# Patient Record
Sex: MALE | Race: WHITE | NOT HISPANIC OR LATINO | Employment: OTHER | URBAN - METROPOLITAN AREA
[De-identification: names, ages, dates, MRNs, and addresses within clinical notes are randomized per-mention and may not be internally consistent; named-entity substitution may affect disease eponyms.]

---

## 2021-03-05 ENCOUNTER — HOSPITAL ENCOUNTER (OUTPATIENT)
Dept: RADIOLOGY | Facility: MEDICAL CENTER | Age: 56
End: 2021-03-05
Payer: MEDICARE

## 2021-03-05 ENCOUNTER — HOSPITAL ENCOUNTER (INPATIENT)
Facility: MEDICAL CENTER | Age: 56
LOS: 3 days | DRG: 552 | End: 2021-03-08
Attending: EMERGENCY MEDICINE | Admitting: HOSPITALIST
Payer: MEDICARE

## 2021-03-05 DIAGNOSIS — S22.000A COMPRESSION FRACTURE OF BODY OF THORACIC VERTEBRA (HCC): ICD-10-CM

## 2021-03-05 DIAGNOSIS — S22.000A COMPRESSION FRACTURE OF THORACIC VERTEBRA, INITIAL ENCOUNTER, UNSPECIFIED THORACIC VERTEBRAL LEVEL: ICD-10-CM

## 2021-03-05 DIAGNOSIS — R56.9 SEIZURES (HCC): ICD-10-CM

## 2021-03-05 DIAGNOSIS — M54.50 ACUTE MIDLINE LOW BACK PAIN WITHOUT SCIATICA: ICD-10-CM

## 2021-03-05 DIAGNOSIS — S32.000A COMPRESSION FRACTURE OF LUMBAR VERTEBRA, INITIAL ENCOUNTER, UNSPECIFIED LUMBAR VERTEBRAL LEVEL: ICD-10-CM

## 2021-03-05 PROBLEM — H91.90 HEARING LOSS: Status: ACTIVE | Noted: 2021-03-05

## 2021-03-05 PROBLEM — R74.8 ELEVATED LIVER ENZYMES: Status: ACTIVE | Noted: 2021-03-05

## 2021-03-05 PROBLEM — D72.829 LEUKOCYTOSIS: Status: ACTIVE | Noted: 2021-03-05

## 2021-03-05 LAB
SARS-COV-2 RNA RESP QL NAA+PROBE: NOTDETECTED
SPECIMEN SOURCE: NORMAL

## 2021-03-05 PROCEDURE — 96372 THER/PROPH/DIAG INJ SC/IM: CPT

## 2021-03-05 PROCEDURE — 700102 HCHG RX REV CODE 250 W/ 637 OVERRIDE(OP): Performed by: HOSPITALIST

## 2021-03-05 PROCEDURE — 99285 EMERGENCY DEPT VISIT HI MDM: CPT

## 2021-03-05 PROCEDURE — U0003 INFECTIOUS AGENT DETECTION BY NUCLEIC ACID (DNA OR RNA); SEVERE ACUTE RESPIRATORY SYNDROME CORONAVIRUS 2 (SARS-COV-2) (CORONAVIRUS DISEASE [COVID-19]), AMPLIFIED PROBE TECHNIQUE, MAKING USE OF HIGH THROUGHPUT TECHNOLOGIES AS DESCRIBED BY CMS-2020-01-R: HCPCS

## 2021-03-05 PROCEDURE — U0005 INFEC AGEN DETEC AMPLI PROBE: HCPCS

## 2021-03-05 PROCEDURE — 99222 1ST HOSP IP/OBS MODERATE 55: CPT | Mod: AI | Performed by: HOSPITALIST

## 2021-03-05 PROCEDURE — 770006 HCHG ROOM/CARE - MED/SURG/GYN SEMI*

## 2021-03-05 PROCEDURE — 700111 HCHG RX REV CODE 636 W/ 250 OVERRIDE (IP): Performed by: HOSPITALIST

## 2021-03-05 PROCEDURE — 96374 THER/PROPH/DIAG INJ IV PUSH: CPT

## 2021-03-05 PROCEDURE — 700111 HCHG RX REV CODE 636 W/ 250 OVERRIDE (IP): Performed by: EMERGENCY MEDICINE

## 2021-03-05 PROCEDURE — 700105 HCHG RX REV CODE 258: Performed by: HOSPITALIST

## 2021-03-05 PROCEDURE — A9270 NON-COVERED ITEM OR SERVICE: HCPCS | Performed by: HOSPITALIST

## 2021-03-05 RX ORDER — DIVALPROEX SODIUM 500 MG/1
3000 TABLET, EXTENDED RELEASE ORAL 2 TIMES DAILY
COMMUNITY

## 2021-03-05 RX ORDER — FLUTICASONE PROPIONATE 50 MCG
2 SPRAY, SUSPENSION (ML) NASAL EVERY MORNING
COMMUNITY

## 2021-03-05 RX ORDER — LACOSAMIDE 100 MG/1
300 TABLET ORAL 2 TIMES DAILY
Status: DISCONTINUED | OUTPATIENT
Start: 2021-03-05 | End: 2021-03-08 | Stop reason: HOSPADM

## 2021-03-05 RX ORDER — LACOSAMIDE 200 MG/1
300 TABLET ORAL 2 TIMES DAILY
COMMUNITY

## 2021-03-05 RX ORDER — FLUTICASONE PROPIONATE 50 MCG
2 SPRAY, SUSPENSION (ML) NASAL EVERY MORNING
Status: DISCONTINUED | OUTPATIENT
Start: 2021-03-06 | End: 2021-03-08 | Stop reason: HOSPADM

## 2021-03-05 RX ORDER — ONDANSETRON 2 MG/ML
4 INJECTION INTRAMUSCULAR; INTRAVENOUS EVERY 4 HOURS PRN
Status: DISCONTINUED | OUTPATIENT
Start: 2021-03-05 | End: 2021-03-08 | Stop reason: HOSPADM

## 2021-03-05 RX ORDER — ESCITALOPRAM OXALATE 20 MG/1
20 TABLET ORAL
COMMUNITY

## 2021-03-05 RX ORDER — BUSPIRONE HYDROCHLORIDE 10 MG/1
10 TABLET ORAL 2 TIMES DAILY
Status: DISCONTINUED | OUTPATIENT
Start: 2021-03-05 | End: 2021-03-08 | Stop reason: HOSPADM

## 2021-03-05 RX ORDER — MORPHINE SULFATE 4 MG/ML
4 INJECTION, SOLUTION INTRAMUSCULAR; INTRAVENOUS ONCE
Status: COMPLETED | OUTPATIENT
Start: 2021-03-05 | End: 2021-03-05

## 2021-03-05 RX ORDER — PROMETHAZINE HYDROCHLORIDE 25 MG/1
12.5-25 TABLET ORAL EVERY 4 HOURS PRN
Status: DISCONTINUED | OUTPATIENT
Start: 2021-03-05 | End: 2021-03-08 | Stop reason: HOSPADM

## 2021-03-05 RX ORDER — PROMETHAZINE HYDROCHLORIDE 25 MG/1
12.5-25 SUPPOSITORY RECTAL EVERY 4 HOURS PRN
Status: DISCONTINUED | OUTPATIENT
Start: 2021-03-05 | End: 2021-03-08 | Stop reason: HOSPADM

## 2021-03-05 RX ORDER — ACETAMINOPHEN 325 MG/1
650 TABLET ORAL EVERY 6 HOURS PRN
Status: DISCONTINUED | OUTPATIENT
Start: 2021-03-05 | End: 2021-03-08 | Stop reason: HOSPADM

## 2021-03-05 RX ORDER — HYDROCODONE BITARTRATE AND ACETAMINOPHEN 5; 325 MG/1; MG/1
1-2 TABLET ORAL EVERY 6 HOURS PRN
Status: DISCONTINUED | OUTPATIENT
Start: 2021-03-05 | End: 2021-03-08 | Stop reason: HOSPADM

## 2021-03-05 RX ORDER — SODIUM CHLORIDE 9 MG/ML
INJECTION, SOLUTION INTRAVENOUS CONTINUOUS
Status: DISCONTINUED | OUTPATIENT
Start: 2021-03-05 | End: 2021-03-06

## 2021-03-05 RX ORDER — BISACODYL 10 MG
10 SUPPOSITORY, RECTAL RECTAL
Status: DISCONTINUED | OUTPATIENT
Start: 2021-03-05 | End: 2021-03-08 | Stop reason: HOSPADM

## 2021-03-05 RX ORDER — ESCITALOPRAM OXALATE 10 MG/1
20 TABLET ORAL
Status: DISCONTINUED | OUTPATIENT
Start: 2021-03-05 | End: 2021-03-08 | Stop reason: HOSPADM

## 2021-03-05 RX ORDER — DIVALPROEX SODIUM 500 MG/1
3000 TABLET, EXTENDED RELEASE ORAL 2 TIMES DAILY
Status: DISCONTINUED | OUTPATIENT
Start: 2021-03-05 | End: 2021-03-08 | Stop reason: HOSPADM

## 2021-03-05 RX ORDER — AMOXICILLIN 250 MG
2 CAPSULE ORAL 2 TIMES DAILY
Status: DISCONTINUED | OUTPATIENT
Start: 2021-03-05 | End: 2021-03-08 | Stop reason: HOSPADM

## 2021-03-05 RX ORDER — BUSPIRONE HYDROCHLORIDE 5 MG/1
10 TABLET ORAL 2 TIMES DAILY
COMMUNITY

## 2021-03-05 RX ORDER — LORAZEPAM 2 MG/ML
1 INJECTION INTRAMUSCULAR EVERY 4 HOURS PRN
Status: DISCONTINUED | OUTPATIENT
Start: 2021-03-05 | End: 2021-03-08 | Stop reason: HOSPADM

## 2021-03-05 RX ORDER — POLYETHYLENE GLYCOL 3350 17 G/17G
1 POWDER, FOR SOLUTION ORAL
Status: DISCONTINUED | OUTPATIENT
Start: 2021-03-05 | End: 2021-03-08 | Stop reason: HOSPADM

## 2021-03-05 RX ORDER — PROCHLORPERAZINE EDISYLATE 5 MG/ML
5-10 INJECTION INTRAMUSCULAR; INTRAVENOUS EVERY 4 HOURS PRN
Status: DISCONTINUED | OUTPATIENT
Start: 2021-03-05 | End: 2021-03-08 | Stop reason: HOSPADM

## 2021-03-05 RX ORDER — ONDANSETRON 4 MG/1
4 TABLET, ORALLY DISINTEGRATING ORAL EVERY 4 HOURS PRN
Status: DISCONTINUED | OUTPATIENT
Start: 2021-03-05 | End: 2021-03-08 | Stop reason: HOSPADM

## 2021-03-05 RX ADMIN — DIVALPROEX SODIUM 3000 MG: 500 TABLET, EXTENDED RELEASE ORAL at 17:25

## 2021-03-05 RX ADMIN — HYDROCODONE BITARTRATE AND ACETAMINOPHEN 2 TABLET: 5; 325 TABLET ORAL at 17:24

## 2021-03-05 RX ADMIN — BUSPIRONE HYDROCHLORIDE 10 MG: 10 TABLET ORAL at 17:25

## 2021-03-05 RX ADMIN — SODIUM CHLORIDE: 9 INJECTION, SOLUTION INTRAVENOUS at 22:28

## 2021-03-05 RX ADMIN — HYDROCODONE BITARTRATE AND ACETAMINOPHEN 1 TABLET: 5; 325 TABLET ORAL at 23:59

## 2021-03-05 RX ADMIN — ENOXAPARIN SODIUM 40 MG: 40 INJECTION SUBCUTANEOUS at 17:24

## 2021-03-05 RX ADMIN — LACOSAMIDE 300 MG: 100 TABLET, FILM COATED ORAL at 17:24

## 2021-03-05 RX ADMIN — ESLICARBAZEPINE ACETATE 1200 MG: 600 TABLET ORAL at 20:44

## 2021-03-05 RX ADMIN — ESCITALOPRAM OXALATE 20 MG: 10 TABLET ORAL at 20:43

## 2021-03-05 RX ADMIN — MORPHINE SULFATE 4 MG: 4 INJECTION INTRAVENOUS at 16:18

## 2021-03-05 ASSESSMENT — LIFESTYLE VARIABLES
HOW MANY TIMES IN THE PAST YEAR HAVE YOU HAD 5 OR MORE DRINKS IN A DAY: 0
HAVE YOU EVER FELT YOU SHOULD CUT DOWN ON YOUR DRINKING: NO
ON A TYPICAL DAY WHEN YOU DRINK ALCOHOL HOW MANY DRINKS DO YOU HAVE: 2
TOTAL SCORE: 0
CONSUMPTION TOTAL: NEGATIVE
EVER HAD A DRINK FIRST THING IN THE MORNING TO STEADY YOUR NERVES TO GET RID OF A HANGOVER: NO
ALCOHOL_USE: YES
SUBSTANCE_ABUSE: 0
DOES PATIENT WANT TO STOP DRINKING: NO
HAVE PEOPLE ANNOYED YOU BY CRITICIZING YOUR DRINKING: NO
AVERAGE NUMBER OF DAYS PER WEEK YOU HAVE A DRINK CONTAINING ALCOHOL: 1
TOTAL SCORE: 0
TOTAL SCORE: 0
EVER FELT BAD OR GUILTY ABOUT YOUR DRINKING: NO

## 2021-03-05 ASSESSMENT — ENCOUNTER SYMPTOMS
SORE THROAT: 0
DIAPHORESIS: 0
BLURRED VISION: 0
FEVER: 0
FOCAL WEAKNESS: 0
GASTROINTESTINAL NEGATIVE: 1
BRUISES/BLEEDS EASILY: 0
VOMITING: 0
EYES NEGATIVE: 1
SHORTNESS OF BREATH: 0
HEADACHES: 1
CARDIOVASCULAR NEGATIVE: 1
CONSTITUTIONAL NEGATIVE: 1
WEIGHT LOSS: 0
NAUSEA: 0
WEAKNESS: 0
CHILLS: 0
MYALGIAS: 0
RESPIRATORY NEGATIVE: 1
COUGH: 0
BACK PAIN: 1
PSYCHIATRIC NEGATIVE: 1
DEPRESSION: 0
DIZZINESS: 0
ABDOMINAL PAIN: 0
PALPITATIONS: 0

## 2021-03-05 ASSESSMENT — PAIN DESCRIPTION - PAIN TYPE
TYPE: ACUTE PAIN

## 2021-03-05 NOTE — ED PROVIDER NOTES
ED Provider Note    CHIEF COMPLAINT  Seizures    HPI  Mac Styles is a 55 y.o. male who presents on transfer from [facility] for higher level of care.  Patient had a recent multiple seizures.  Patient takes Vimpat, Apitiom.  Patient was evaluated for low back pain and flank pain after a seizure.  No acute numbness or weakness.  Work-up at the [facility] with CT scan that demonstrated acute compression deformities T11-L2 described with mild height loss.  CT scan of his abdomen and pelvis was negative for acute pathology.  Patient stated his seizures started 11 years ago.  In December, he stopped taking Depakote in addition to his other 2 seizure medicines.  Patient had 2 seizures yesterday while riding in a car which is very abnormal for him.  He is having a headache right now which she states is usual for having a seizure.  No seizure activity today.  He states low back pain was moderate yesterday, severe this morning.  He denies acute numbness or weakness.  No acute vision change.  Patient states when his seizure control is good he has a seizure proximally once every 3 months, when it is not doing well, he states he will have 1 seizure a week.  Patient was returning from evaluation at [facility] in the car when his seizures happened yesterday.  He states he had an MRI of his brain 2 days ago.      REVIEW OF SYSTEMS    Constitutional: No fever  Respiratory: No shortness of breath  Cardiac: No chest pain or syncope  Gastrointestinal: No abdominal pain  Musculoskeletal: Flank pain, low back pain  Neurologic: Seizures       All other systems are negative.       PAST MEDICAL HISTORY  No past medical history on file.    FAMILY HISTORY  No family history on file.    SOCIAL HISTORY  Social History     Socioeconomic History   • Marital status: Not on file     Spouse name: Not on file   • Number of children: Not on file   • Years of education: Not on file   • Highest education level: Not on file   Occupational  History   • Not on file   Tobacco Use   • Smoking status: Not on file   Substance and Sexual Activity   • Alcohol use: Not on file   • Drug use: Not on file   • Sexual activity: Not on file   Other Topics Concern   • Not on file   Social History Narrative   • Not on file     Social Determinants of Health     Financial Resource Strain:    • Difficulty of Paying Living Expenses:    Food Insecurity:    • Worried About Running Out of Food in the Last Year:    • Ran Out of Food in the Last Year:    Transportation Needs:    • Lack of Transportation (Medical):    • Lack of Transportation (Non-Medical):    Physical Activity:    • Days of Exercise per Week:    • Minutes of Exercise per Session:    Stress:    • Feeling of Stress :    Social Connections:    • Frequency of Communication with Friends and Family:    • Frequency of Social Gatherings with Friends and Family:    • Attends Rastafari Services:    • Active Member of Clubs or Organizations:    • Attends Club or Organization Meetings:    • Marital Status:    Intimate Partner Violence:    • Fear of Current or Ex-Partner:    • Emotionally Abused:    • Physically Abused:    • Sexually Abused:        SURGICAL HISTORY  No past surgical history on file.    CURRENT MEDICATIONS  Home Medications    **Home medications have not yet been reviewed for this encounter**         ALLERGIES  Not on File    PHYSICAL EXAM  VITAL SIGNS: Ht 1.829 m (6')   Wt 61.2 kg (135 lb)   BMI 18.31 kg/m²   Constitutional:  Non-toxic appearance.   HENT: No facial swelling, tongue bite  Eyes: Anicteric, no conjunctivitis.  No fields intact.  Extraocular motions intact.  No nystagmus.  Pupils are 3 mm bilateral, equally reactive to light  Cardiovascular: Normal heart rate, Normal rhythm  Pulmonary:  No wheezing, No rales.   Gastrointestinal: Soft, mild periumbilical tenderness, No distention.  Skin: Warm, Dry, No cyanosis.  No asymmetric edema  Neurologic: Speech is clear, follows commands, facial  expression is symmetrical.  Sensation and strength intact.  Psychiatric: Affect normal,  Mood normal.  Patient is calm and cooperative  Musculoskeletal: Lumbar spine tender midline and left paraspinal.  Extremities nontender.  Chest wall nontender.    EKG/Labs  Results for orders placed or performed during the hospital encounter of 03/05/21   SARS-CoV-2 PCR (24 hour In-House): Collect NP swab in VTM    Specimen: Respirate   Result Value Ref Range    SARS-CoV-2 Source NP Swab     SARS-CoV-2 by PCR NotDetected          RADIOLOGY/PROCEDURES  CT lumbar spine showed compression fractures.  CT scan abdomen pelvis with no acute findings.  Patient states he had MRI of his brain 2 days ago at [facility].    COURSE & MEDICAL DECISION MAKING  Pertinent Labs & Imaging studies reviewed. (See chart for details)  Dr. Mcfarlane consulted from neurosurgery regarding possible need for TLSO brace, other intervention.  Hospitalist service has agreed to admit patient to hospital for ongoing evaluation of his seizures, possible medication adjustment, neurology consultation.  Pain treated with morphine in the emergency department.  He is currently neurologically intact, resting.    FINAL IMPRESSION     1. Seizures (HCC)     2. Acute midline low back pain without sciatica     3. Compression fracture of thoracic vertebra, initial encounter, unspecified thoracic vertebral level (HCC)     4. Compression fracture of lumbar vertebra, initial encounter, unspecified lumbar vertebral level (HCC)                     Electronically signed by: Derrick Miner M.D., 3/5/2021 2:26 PM

## 2021-03-05 NOTE — ED NOTES
Pharmacy Medication Reconciliation      Medication reconciliation updated and complete per pt at bedside & pt home pharmacy  Allergies have been verified and updated   No oral ABX within the last 14 days  Patient home pharmacy:[facility]
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
## 2021-03-05 NOTE — ASSESSMENT & PLAN NOTE
Patient presents with low back pain , ridging shows T12 compression fracture can Bishnu to trauma from seizure activity  Patient is neurovascularly intact  Neurosurgery recommending TLSO brace and outpatient follow-up  Supportive care with pain control  PT OT

## 2021-03-05 NOTE — PROGRESS NOTES
Post-seizure contiguous compression fractures. Custom TLSO ordered, wear when out of bed, not in shower not sleeping, Q4 hr neuro checks

## 2021-03-05 NOTE — ASSESSMENT & PLAN NOTE
Patient has a long history of epilepsy after a traumatic brain injury approximately 10 years ago, he follows with neurology at [facility] .  He recently had medication changes .  Patient is a poor historian and is unable to explicitly tell me the doses of his current medication regiment.  He states that he has never had good control in the long as he is ever been without a seizure is 2 months.  States that he usually has seizures approximately once a week.  He does not drive  Mri at [facility] yesterday, did attempt to reach his neurologist at [facility] however this was unsuccessful and the office is not open on the weekends  Do not feel at this time that further imaging is necessary as he just had an MRI the day of admission  Depakote level is supratherapeutic at 134, Segundo p.m. dose with repeat level in the morning  May need to consult inpatient neurology for recommendations on antiepileptic regiment and patient's report of refractory seizure  Continue his home antiepileptic regiment  As needed IV Ativan for seizure activity  Seizure and fall precautions

## 2021-03-05 NOTE — ED TRIAGE NOTES
Chief Complaint   Patient presents with   • Seizure     Pt BIB EMS, transfer from [facility] for seizures (tonic clonic). pt with hx of SZ/epilepsy. pt found to have retropulsion of T-12/L-1, pt A&O3-4        Pt/staff masked and in appropriate PPE during encounter.

## 2021-03-06 LAB
ALBUMIN SERPL BCP-MCNC: 3.8 G/DL (ref 3.2–4.9)
ALBUMIN/GLOB SERPL: 1.4 G/DL
ALP SERPL-CCNC: 61 U/L (ref 30–99)
ALT SERPL-CCNC: 32 U/L (ref 2–50)
ANION GAP SERPL CALC-SCNC: 11 MMOL/L (ref 7–16)
AST SERPL-CCNC: 28 U/L (ref 12–45)
BASOPHILS # BLD AUTO: 0.4 % (ref 0–1.8)
BASOPHILS # BLD: 0.04 K/UL (ref 0–0.12)
BILIRUB SERPL-MCNC: 1.4 MG/DL (ref 0.1–1.5)
BUN SERPL-MCNC: 12 MG/DL (ref 8–22)
CALCIUM SERPL-MCNC: 8.9 MG/DL (ref 8.5–10.5)
CHLORIDE SERPL-SCNC: 104 MMOL/L (ref 96–112)
CO2 SERPL-SCNC: 22 MMOL/L (ref 20–33)
CREAT SERPL-MCNC: 0.74 MG/DL (ref 0.5–1.4)
EOSINOPHIL # BLD AUTO: 0.03 K/UL (ref 0–0.51)
EOSINOPHIL NFR BLD: 0.3 % (ref 0–6.9)
ERYTHROCYTE [DISTWIDTH] IN BLOOD BY AUTOMATED COUNT: 46.5 FL (ref 35.9–50)
GLOBULIN SER CALC-MCNC: 2.7 G/DL (ref 1.9–3.5)
GLUCOSE SERPL-MCNC: 104 MG/DL (ref 65–99)
HCT VFR BLD AUTO: 42.9 % (ref 42–52)
HGB BLD-MCNC: 15 G/DL (ref 14–18)
IMM GRANULOCYTES # BLD AUTO: 0.06 K/UL (ref 0–0.11)
IMM GRANULOCYTES NFR BLD AUTO: 0.6 % (ref 0–0.9)
LYMPHOCYTES # BLD AUTO: 1.92 K/UL (ref 1–4.8)
LYMPHOCYTES NFR BLD: 18 % (ref 22–41)
MCH RBC QN AUTO: 35 PG (ref 27–33)
MCHC RBC AUTO-ENTMCNC: 35 G/DL (ref 33.7–35.3)
MCV RBC AUTO: 100.2 FL (ref 81.4–97.8)
MONOCYTES # BLD AUTO: 0.71 K/UL (ref 0–0.85)
MONOCYTES NFR BLD AUTO: 6.7 % (ref 0–13.4)
NEUTROPHILS # BLD AUTO: 7.89 K/UL (ref 1.82–7.42)
NEUTROPHILS NFR BLD: 74 % (ref 44–72)
NRBC # BLD AUTO: 0 K/UL
NRBC BLD-RTO: 0 /100 WBC
PLATELET # BLD AUTO: 158 K/UL (ref 164–446)
PMV BLD AUTO: 9.8 FL (ref 9–12.9)
POTASSIUM SERPL-SCNC: 3.7 MMOL/L (ref 3.6–5.5)
PROT SERPL-MCNC: 6.5 G/DL (ref 6–8.2)
RBC # BLD AUTO: 4.28 M/UL (ref 4.7–6.1)
SODIUM SERPL-SCNC: 137 MMOL/L (ref 135–145)
WBC # BLD AUTO: 10.7 K/UL (ref 4.8–10.8)

## 2021-03-06 PROCEDURE — 306637 HCHG MISC ORTHO ITEM RC 0274

## 2021-03-06 PROCEDURE — 99233 SBSQ HOSP IP/OBS HIGH 50: CPT | Performed by: STUDENT IN AN ORGANIZED HEALTH CARE EDUCATION/TRAINING PROGRAM

## 2021-03-06 PROCEDURE — L0486 TLSO RIGIDLINED CUST FAB TWO: HCPCS

## 2021-03-06 PROCEDURE — A9270 NON-COVERED ITEM OR SERVICE: HCPCS | Performed by: HOSPITALIST

## 2021-03-06 PROCEDURE — 96372 THER/PROPH/DIAG INJ SC/IM: CPT

## 2021-03-06 PROCEDURE — 97162 PT EVAL MOD COMPLEX 30 MIN: CPT

## 2021-03-06 PROCEDURE — 700111 HCHG RX REV CODE 636 W/ 250 OVERRIDE (IP): Performed by: HOSPITALIST

## 2021-03-06 PROCEDURE — 700102 HCHG RX REV CODE 250 W/ 637 OVERRIDE(OP): Performed by: HOSPITALIST

## 2021-03-06 PROCEDURE — 770006 HCHG ROOM/CARE - MED/SURG/GYN SEMI*

## 2021-03-06 PROCEDURE — 80053 COMPREHEN METABOLIC PANEL: CPT

## 2021-03-06 PROCEDURE — 36415 COLL VENOUS BLD VENIPUNCTURE: CPT

## 2021-03-06 PROCEDURE — 85025 COMPLETE CBC W/AUTO DIFF WBC: CPT

## 2021-03-06 RX ORDER — LIDOCAINE HYDROCHLORIDE 20 MG/ML
15 SOLUTION OROPHARYNGEAL
Status: DISCONTINUED | OUTPATIENT
Start: 2021-03-06 | End: 2021-03-08 | Stop reason: HOSPADM

## 2021-03-06 RX ADMIN — BUSPIRONE HYDROCHLORIDE 10 MG: 10 TABLET ORAL at 17:19

## 2021-03-06 RX ADMIN — HYDROCODONE BITARTRATE AND ACETAMINOPHEN 1 TABLET: 5; 325 TABLET ORAL at 21:16

## 2021-03-06 RX ADMIN — DIVALPROEX SODIUM 3000 MG: 500 TABLET, EXTENDED RELEASE ORAL at 04:34

## 2021-03-06 RX ADMIN — ESCITALOPRAM OXALATE 20 MG: 10 TABLET ORAL at 21:16

## 2021-03-06 RX ADMIN — HYDROCODONE BITARTRATE AND ACETAMINOPHEN 2 TABLET: 5; 325 TABLET ORAL at 11:24

## 2021-03-06 RX ADMIN — DIVALPROEX SODIUM 3000 MG: 500 TABLET, EXTENDED RELEASE ORAL at 17:19

## 2021-03-06 RX ADMIN — ESLICARBAZEPINE ACETATE 1200 MG: 600 TABLET ORAL at 21:16

## 2021-03-06 RX ADMIN — LACOSAMIDE 300 MG: 100 TABLET, FILM COATED ORAL at 17:19

## 2021-03-06 RX ADMIN — ENOXAPARIN SODIUM 40 MG: 40 INJECTION SUBCUTANEOUS at 04:40

## 2021-03-06 RX ADMIN — DOCUSATE SODIUM 50 MG AND SENNOSIDES 8.6 MG 2 TABLET: 8.6; 5 TABLET, FILM COATED ORAL at 17:20

## 2021-03-06 RX ADMIN — LACOSAMIDE 300 MG: 100 TABLET, FILM COATED ORAL at 04:33

## 2021-03-06 RX ADMIN — BUSPIRONE HYDROCHLORIDE 10 MG: 10 TABLET ORAL at 04:34

## 2021-03-06 RX ADMIN — FLUTICASONE PROPIONATE 100 MCG: 50 SPRAY, METERED NASAL at 04:33

## 2021-03-06 ASSESSMENT — COGNITIVE AND FUNCTIONAL STATUS - GENERAL
MOBILITY SCORE: 15
STANDING UP FROM CHAIR USING ARMS: A LITTLE
MOVING TO AND FROM BED TO CHAIR: A LOT
DRESSING REGULAR LOWER BODY CLOTHING: A LITTLE
CLIMB 3 TO 5 STEPS WITH RAILING: A LOT
DAILY ACTIVITIY SCORE: 22
MOVING FROM LYING ON BACK TO SITTING ON SIDE OF FLAT BED: A LITTLE
SUGGESTED CMS G CODE MODIFIER MOBILITY: CK
TURNING FROM BACK TO SIDE WHILE IN FLAT BAD: A LITTLE
TOILETING: A LITTLE
MOBILITY SCORE: 18
SUGGESTED CMS G CODE MODIFIER DAILY ACTIVITY: CJ
SUGGESTED CMS G CODE MODIFIER MOBILITY: CK
CLIMB 3 TO 5 STEPS WITH RAILING: A LOT
MOVING FROM LYING ON BACK TO SITTING ON SIDE OF FLAT BED: A LOT
STANDING UP FROM CHAIR USING ARMS: A LITTLE
MOVING TO AND FROM BED TO CHAIR: A LITTLE
WALKING IN HOSPITAL ROOM: A LITTLE
WALKING IN HOSPITAL ROOM: A LITTLE

## 2021-03-06 ASSESSMENT — PAIN DESCRIPTION - PAIN TYPE
TYPE: ACUTE PAIN

## 2021-03-06 ASSESSMENT — ENCOUNTER SYMPTOMS
NERVOUS/ANXIOUS: 0
ABDOMINAL PAIN: 0
BLURRED VISION: 0
VOMITING: 0
SEIZURES: 1
DIZZINESS: 0
CHILLS: 0
NAUSEA: 0
SHORTNESS OF BREATH: 0
FOCAL WEAKNESS: 0
COUGH: 0
DEPRESSION: 0
BACK PAIN: 1
SPEECH CHANGE: 0
FEVER: 0
SORE THROAT: 0

## 2021-03-06 ASSESSMENT — PATIENT HEALTH QUESTIONNAIRE - PHQ9
SUM OF ALL RESPONSES TO PHQ9 QUESTIONS 1 AND 2: 0
2. FEELING DOWN, DEPRESSED, IRRITABLE, OR HOPELESS: NOT AT ALL
1. LITTLE INTEREST OR PLEASURE IN DOING THINGS: NOT AT ALL

## 2021-03-06 ASSESSMENT — GAIT ASSESSMENTS
DISTANCE (FEET): 100
DEVIATION: BRADYKINETIC
ASSISTIVE DEVICE: FRONT WHEEL WALKER

## 2021-03-06 ASSESSMENT — FIBROSIS 4 INDEX: FIB4 SCORE: 1.66

## 2021-03-06 NOTE — THERAPY
Physical Therapy Contact Note    PT consult received, pt awaiting custom TLSO for OOB mobility; will follow up after available when able for full PT evaluation;    Jessica Rogel, PT,  [phone]
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
## 2021-03-06 NOTE — PROGRESS NOTES
2 RN skin check complete with CASEY Guerrero.  Devices in place: nasal cannula.  Skin assessed under devices.  Skin clean, dry, and intact.

## 2021-03-06 NOTE — PROGRESS NOTES
Submitted custom order for TLSO brace.  To check the status of the order please call [phone].

## 2021-03-06 NOTE — CARE PLAN
Problem: Communication  Goal: The ability to communicate needs accurately and effectively will improve  Outcome: PROGRESSING AS EXPECTED    Problem: Safety  Goal: Will remain free from injury  Outcome: PROGRESSING AS EXPECTED     Problem: Infection  Goal: Will remain free from infection  Outcome: PROGRESSING AS EXPECTED     Problem: Knowledge Deficit  Goal: Knowledge of disease process/condition, treatment plan, diagnostic tests, and medications will improve  Outcome: PROGRESSING AS EXPECTED     Problem: Pain Management  Goal: Pain level will decrease to patient's comfort goal  Outcome: PROGRESSING AS EXPECTED     Problem: Bowel/Gastric:  Goal: Normal bowel function is maintained or improved  Outcome: PROGRESSING SLOWER THAN EXPECTED

## 2021-03-06 NOTE — PROGRESS NOTES
Received report from Iza PEÑA. Pt is alert and oriented x4. Pt is not complaining of any pain and does not appear to be in distress. Respirations are normal. Pt is resting comfortably in bed at this time. Bed alarm is on and call light is within reach. Will continue to monitor patient.

## 2021-03-07 LAB — VALPROATE SERPL-MCNC: 134.2 UG/ML (ref 50–100)

## 2021-03-07 PROCEDURE — 99233 SBSQ HOSP IP/OBS HIGH 50: CPT | Performed by: STUDENT IN AN ORGANIZED HEALTH CARE EDUCATION/TRAINING PROGRAM

## 2021-03-07 PROCEDURE — 97165 OT EVAL LOW COMPLEX 30 MIN: CPT

## 2021-03-07 PROCEDURE — 700111 HCHG RX REV CODE 636 W/ 250 OVERRIDE (IP): Performed by: HOSPITALIST

## 2021-03-07 PROCEDURE — 770006 HCHG ROOM/CARE - MED/SURG/GYN SEMI*

## 2021-03-07 PROCEDURE — A9270 NON-COVERED ITEM OR SERVICE: HCPCS | Performed by: HOSPITALIST

## 2021-03-07 PROCEDURE — 96372 THER/PROPH/DIAG INJ SC/IM: CPT

## 2021-03-07 PROCEDURE — 80164 ASSAY DIPROPYLACETIC ACD TOT: CPT

## 2021-03-07 PROCEDURE — 700102 HCHG RX REV CODE 250 W/ 637 OVERRIDE(OP): Performed by: HOSPITALIST

## 2021-03-07 RX ADMIN — BUSPIRONE HYDROCHLORIDE 10 MG: 10 TABLET ORAL at 17:50

## 2021-03-07 RX ADMIN — HYDROCODONE BITARTRATE AND ACETAMINOPHEN 1 TABLET: 5; 325 TABLET ORAL at 17:51

## 2021-03-07 RX ADMIN — DOCUSATE SODIUM 50 MG AND SENNOSIDES 8.6 MG 2 TABLET: 8.6; 5 TABLET, FILM COATED ORAL at 17:50

## 2021-03-07 RX ADMIN — DOCUSATE SODIUM 50 MG AND SENNOSIDES 8.6 MG 2 TABLET: 8.6; 5 TABLET, FILM COATED ORAL at 06:04

## 2021-03-07 RX ADMIN — ENOXAPARIN SODIUM 40 MG: 40 INJECTION SUBCUTANEOUS at 06:03

## 2021-03-07 RX ADMIN — BUSPIRONE HYDROCHLORIDE 10 MG: 10 TABLET ORAL at 06:03

## 2021-03-07 RX ADMIN — HYDROCODONE BITARTRATE AND ACETAMINOPHEN 1 TABLET: 5; 325 TABLET ORAL at 08:01

## 2021-03-07 RX ADMIN — ESLICARBAZEPINE ACETATE 1200 MG: 600 TABLET ORAL at 20:13

## 2021-03-07 RX ADMIN — ESCITALOPRAM OXALATE 20 MG: 10 TABLET ORAL at 20:13

## 2021-03-07 RX ADMIN — LACOSAMIDE 300 MG: 100 TABLET, FILM COATED ORAL at 06:03

## 2021-03-07 RX ADMIN — FLUTICASONE PROPIONATE 100 MCG: 50 SPRAY, METERED NASAL at 06:03

## 2021-03-07 RX ADMIN — DIVALPROEX SODIUM 3000 MG: 500 TABLET, EXTENDED RELEASE ORAL at 06:03

## 2021-03-07 RX ADMIN — LACOSAMIDE 300 MG: 100 TABLET, FILM COATED ORAL at 17:50

## 2021-03-07 ASSESSMENT — ENCOUNTER SYMPTOMS
SHORTNESS OF BREATH: 0
BACK PAIN: 1
FOCAL WEAKNESS: 0
SEIZURES: 0
NAUSEA: 0
DIZZINESS: 0
ABDOMINAL PAIN: 0
VOMITING: 0
FEVER: 0
CHILLS: 0
COUGH: 0
SPEECH CHANGE: 0
NERVOUS/ANXIOUS: 0
DEPRESSION: 0
BLURRED VISION: 0
SORE THROAT: 0

## 2021-03-07 ASSESSMENT — COGNITIVE AND FUNCTIONAL STATUS - GENERAL
DAILY ACTIVITIY SCORE: 20
DRESSING REGULAR UPPER BODY CLOTHING: A LOT
SUGGESTED CMS G CODE MODIFIER DAILY ACTIVITY: CJ
TOILETING: A LITTLE
HELP NEEDED FOR BATHING: A LITTLE

## 2021-03-07 ASSESSMENT — PAIN DESCRIPTION - PAIN TYPE: TYPE: ACUTE PAIN

## 2021-03-07 NOTE — PROGRESS NOTES
Hospital Medicine Daily Progress Note    Date of Service  3/6/2021    Chief Complaint  55 y.o. male admitted 3/5/2021 with seizure and T12 compression fracture    Hospital Course  Mr. Styles is a pleasant 55-year-old gentleman with a past medical history of epilepsy that started 10 years ago after traumatic brain injury.  Reports that his seizures have never been well controlled despite many medication changes he is followed by Dr. Alex Christian of neurology at [facility].  He was traveling back from [facility] on 3/5/2021 when he had to back-to-back tonic-clonic seizures in the vehicle.  Following this he developed severe low back pain and was unable to ambulate so he came to the ER in [place] where he was found to have compression fracture of T12.  He was transferred here for further evaluation.  Here he was evaluated by neurosurgery Dr. Ki Mcfarlane who recommended a TLSO brace and every 4 hours neurochecks.  No surgical intervention is planned at this time.       Interval Problem Update  Patient seen and examined at bedside, complains of low back pain.  Patient has been unable to work with PT as he is still awaiting delivery of his TLSO brace.   -His vitals are stable and he is afebrile  -He has not had a seizure since admission.  He states he has never had good control of his seizures  -Dispo pending PT/OT  -Continue his current antiepileptic regiment    Consultants/Specialty  Neurosurgery    Code Status  Full Code    Disposition  Anticipate home in the next 24 to 48 hours ending PT OT evaluation    Review of Systems  Review of Systems   Constitutional: Negative for chills, fever and malaise/fatigue.   HENT: Negative for congestion and sore throat.    Eyes: Negative for blurred vision.   Respiratory: Negative for cough and shortness of breath.    Cardiovascular: Negative for chest pain and leg swelling.   Gastrointestinal: Negative for abdominal pain, nausea and vomiting.   Genitourinary: Negative for dysuria.    Musculoskeletal: Positive for back pain.   Neurological: Positive for seizures. Negative for dizziness, speech change and focal weakness.   Psychiatric/Behavioral: Negative for depression. The patient is not nervous/anxious.         Physical Exam  Temp:  [36.3 °C (97.3 °F)-37.2 °C (98.9 °F)] 36.3 °C (97.3 °F)  Pulse:  [82-95] 89  Resp:  [15-17] 15  BP: (115-137)/(76-88) 122/81  SpO2:  [92 %-97 %] 96 %    Physical Exam  Vitals and nursing note reviewed.   Constitutional:       General: He is not in acute distress.     Appearance: Normal appearance. He is obese. He is not ill-appearing.   HENT:      Head: Normocephalic and atraumatic.      Mouth/Throat:      Mouth: Mucous membranes are dry.      Pharynx: Oropharynx is clear.      Comments: Poor oral dentition  Eyes:      Extraocular Movements: Extraocular movements intact.      Conjunctiva/sclera: Conjunctivae normal.   Cardiovascular:      Rate and Rhythm: Normal rate and regular rhythm.      Pulses: Normal pulses.   Pulmonary:      Effort: Pulmonary effort is normal.      Breath sounds: Normal breath sounds.   Abdominal:      General: Bowel sounds are normal.      Palpations: Abdomen is soft.   Musculoskeletal:         General: Tenderness present.      Cervical back: Normal range of motion.      Right lower leg: No edema.      Left lower leg: No edema.      Comments: Limited ability to sit up due to back pain   Skin:     Capillary Refill: Capillary refill takes less than 2 seconds.   Neurological:      General: No focal deficit present.      Mental Status: He is alert and oriented to person, place, and time. Mental status is at baseline.      Cranial Nerves: No cranial nerve deficit.      Deep Tendon Reflexes: Reflexes normal.   Psychiatric:         Mood and Affect: Mood normal.         Behavior: Behavior normal.         Fluids    Intake/Output Summary (Last 24 hours) at 3/6/2021 1610  Last data filed at 3/5/2021 2349  Gross per 24 hour   Intake --   Output 750 ml    Net -750 ml       Laboratory  Recent Labs     03/06/21  0331   WBC 10.7   RBC 4.28*   HEMOGLOBIN 15.0   HEMATOCRIT 42.9   .2*   MCH 35.0*   MCHC 35.0   RDW 46.5   PLATELETCT 158*   MPV 9.8     Recent Labs     03/06/21  0331   SODIUM 137   POTASSIUM 3.7   CHLORIDE 104   CO2 22   GLUCOSE 104*   BUN 12   CREATININE 0.74   CALCIUM 8.9                   Imaging  OUTSIDE IMAGES-CT ABDOMEN /PELVIS   Final Result      OUTSIDE IMAGES-CT LUMBAR SPINE   Final Result           Assessment/Plan  * Compression fracture of body of thoracic vertebra (HCC)  Assessment & Plan  Patient presents with low back pain , ridging shows T12 compression fracture can Bishnu to trauma from seizure activity  Patient is neurovascularly intact  Neurosurgery recommending TLSO brace and outpatient follow-up  Supportive care with pain control  PT OT    Seizure (HCC)  Assessment & Plan  Patient has a long history of epilepsy after a traumatic brain injury approximately 10 years ago, he follows with neurology at [facility] .  He recently had medication changes .  Patient is a poor historian and is unable to explicitly tell me the doses of his current medication regiment.  He states that he has never had good control in the long as he is ever been without a seizure is 2 months.  States that he usually has seizures approximately once a week.  He does not drive  Mri at [facility] yesterday, did attempt to reach his neurologist at [facility] however this was unsuccessful and the office is not open on the weekends  Do not feel at this time that further imaging is necessary as he just had an MRI the day of admission  Depakote level pending  Continue his home antiepileptic regiment  As needed IV Ativan for seizure activity  Seizure and fall precautions    Elevated liver enzymes  Assessment & Plan  [place] labs, mild elevation of AST at 42 and t bili at 1.6  Resolved    Leukocytosis  Assessment & Plan  Mild, 12.9 at [place]  Likely reactive  Resolved    Hearing  loss  Assessment & Plan  chronic       VTE prophylaxis: Lovenox

## 2021-03-07 NOTE — CARE PLAN
Problem: Safety  Goal: Will remain free from falls  Outcome: PROGRESSING AS EXPECTED  Note: Bed low and locked. Bed alarm on. Non slip socks in use. Pt verbalizes understanding of fall risk.     Problem: Safety:  Goal: Encourage identification and reduction of causative stimuli  Outcome: PROGRESSING AS EXPECTED  Note: Lights dimmed, low stimuli provided.

## 2021-03-07 NOTE — PROGRESS NOTES
Called from lab about patients critical lab results. Valproic acid 134.2. Notified day shift nurse and visualized notification of MD.

## 2021-03-07 NOTE — THERAPY
Physical Therapy   Initial Evaluation     Patient Name: Mac Styles  Age:  55 y.o., Sex:  male  Medical Record #: [id number]  Today's Date: 3/6/2021     Precautions: Fall Risk, Spinal / Back Precautions , TLSO (Thoracolumbosacral orthosis)    Assessment  Patient is 55 y.o. male with a diagnosis of L1 and L2 compression fx d/t a seizure w/ convulsions and LOC. He lives alone in a motor home w/ no functional mobility deficits and ambulating community distances w/ no AD.  He has a 10yr hx of seizures, and back pain that he has been seeking OP PT treatment for about a month prior to admission.  The pt is able to perform bed mobility w/ Bob for trunk support and VC to maintain log roll.  His TLSO was donned EOB, and he was able to STS spv and ambulate about 100' CGA using FWW.  He was provided edu on spinal precautions and exercise recommendations w/ adequate carryover.  Recommend OP PT services to address higher level limitations based on pt's current presentation and good motivation.  Pt should receive at least 1 more session w/ PT for bed mobility and stair training prior to d/c.  Will follow.      Plan    Recommend Physical Therapy 4 times per week until therapy goals are met for the following treatments:  Bed Mobility, Community Re-integration, Equipment, Gait Training, Manual Therapy, Neuro Re-Education / Balance, Orthotics Training, Self Care/Home Evaluation, Stair Training, Therapeutic Activities and Therapeutic Exercises    DC Equipment Recommendations: Unable to determine at this time  Discharge Recommendations: Recommend outpatient physical therapy services to address higher level deficits       Subjective/Objective       03/06/21 1625   Prior Living Situation   Prior Services Home-Independent   Housing / Facility Motor Home   Steps Into Home 5   Steps In Home 5  (up to bed)   Equipment Owned None   Lives with - Patient's Self Care Capacity Alone and Able to Care For Self   Comments Pt reports he can stay w/ a  friend in SS home w/ no steps at d/c   Prior Level of Functional Mobility   Bed Mobility Independent   Transfer Status Independent   Ambulation Independent   Distance Ambulation (Feet)   (community distances)   Assistive Devices Used None   Stairs Independent   History of Falls   History of Falls No   Cognition    Cognition / Consciousness X   Speech/ Communication Hard of Hearing   Level of Consciousness Alert   Comments pt pleasant and cooperative   Strength Lower Body   Lower Body Strength  WDL   Comments Observed during functional mobility   Sensation Lower Body   Lower Extremity Sensation   WDL   Comments Reports no numbness/tingling in LE's   Neurological Concerns   Neurological Concerns Yes   Comments Hx of seizures   Coordination Lower Body    Coordination Lower Body  WDL   Vision   Vision Comments Pt reports poor vision at baseline, was going to see optometrist before admitted   Balance Assessment   Sitting Balance (Static) Fair +   Sitting Balance (Dynamic) Fair +   Standing Balance (Static) Fair -   Standing Balance (Dynamic) Fair -   Weight Shift Sitting Fair   Weight Shift Standing Fair   Comments stand using FWW   Gait Analysis   Gait Level Of Assist   (CGA)   Assistive Device Front Wheel Walker   Distance (Feet) 100   # of Times Distance was Traveled 1   Deviation Bradykinetic   Weight Bearing Status FWB B LE   Comments TLSO donned EOB prior   Bed Mobility    Supine to Sit Minimal Assist   Sit to Supine Minimal Assist   Scooting Supervised   Rolling Supervised   Comments Log roll, HOB flat   Functional Mobility   Sit to Stand Supervised   Mobility FWW in front   Comments TLSO donned EOB   How much difficulty does the patient currently have...   Turning over in bed (including adjusting bedclothes, sheets and blankets)? 3   Sitting down on and standing up from a chair with arms (e.g., wheelchair, bedside commode, etc.) 2   Moving from lying on back to sitting on the side of the bed? 2   How much help  from another person does the patient currently need...   Moving to and from a bed to a chair (including a wheelchair)? 3   Need to walk in a hospital room? 3   Climbing 3-5 steps with a railing? 2   6 clicks Mobility Score 15   Activity Tolerance   Sitting Edge of Bed 10min   Standing 10min   Short Term Goals    Short Term Goal # 1 Pt will demonstrate supine<>sit EOB w/ log roll and HOB flat spv in 6 visits for functional mobility.   Short Term Goal # 2 Pt will demonstrate understanding of spinal precautions in 3 visits for proper healing of fx.   Short Term Goal # 3 Pt will demonstrate ability to ambulate 200' spv using FWW in 6 visits for community ambulation.   Short Term Goal # 4 Pt will demonstrate ability to ascend/descend 5 stairs spv w/ B UE support on railing in 6 visits for mobility into/out of his home.   Education Group   Education Provided Spine Precautions;Role of Physical Therapist;Brace Wear and Care   Spine Precautions Patient Response Patient;Acceptance;Explanation;Demonstration;Handout;Verbal Demonstration;Action Demonstration   Role of Physical Therapist Patient Response Patient;Acceptance;Explanation;Demonstration;Verbal Demonstration;Action Demonstration   Brace Wear & Care Patient Response Patient;Acceptance;Explanation;Demonstration;Verbal Demonstration;Action Demonstration   Problem List    Problems Pain;Impaired Bed Mobility;Impaired Transfers;Impaired Ambulation;Impaired Balance;Decreased Activity Tolerance   Anticipated Discharge Equipment and Recommendations   DC Equipment Recommendations Unable to determine at this time   Discharge Recommendations Recommend outpatient physical therapy services to address higher level deficits

## 2021-03-07 NOTE — HOSPITAL COURSE
Mr. Styles is a pleasant 55-year-old gentleman with a past medical history of epilepsy that started 10 years ago after traumatic brain injury.  Reports that his seizures have never been well controlled despite many medication changes he is followed by Dr. Alex Christian of neurology at [facility].  He was traveling back from [facility] on 3/5/2021 when he had to back-to-back tonic-clonic seizures in the vehicle.  Following this he developed severe low back pain and was unable to ambulate so he came to the ER in [place] where he was found to have compression fracture of T12.  He was transferred here for further evaluation.  Here he was evaluated by neurosurgery Dr. Ki Mcfarlane who recommended a TLSO brace and every 4 hours neurochecks.  No surgical intervention is planned at this time.

## 2021-03-07 NOTE — THERAPY
"Occupational Therapy   Initial Evaluation     Patient Name: Mac Styles  Age:  55 y.o., Sex:  male  Medical Record #: [id number]  Today's Date: 3/7/2021     Precautions  Precautions: Fall Risk, Spinal / Back Precautions , TLSO (Thoracolumbosacral orthosis)  Comments: custom TLSO donned at EOB, difficult fit.     Assessment  Patient is 55 y.o. male with a diagnosis of T12, L1-L2 compression fractures following a seizure.  Additional factors influencing patient status / progress: Hx of seizures. Custom TLSO ordered for him, dons at EOB. Patient is doing quite well overall, he was able to log roll independently to come to sitting without help, did report increased pain once seated EOB. The TLSO fits him just right, so there isn't a lot of wiggle room and donning it while patient is sitting is challenging. He was able to stand without assist, walked half the unit with FWW without any compromised balance. He reports that he will be staying with a friend following discharge. At this time, he really only needs help with the TLSO, would be beneficial for his friend to be educated on how to help him don it.       Plan    Recommend Occupational Therapy 3 times per week until therapy goals are met for the following treatments:  Adaptive Equipment, Cognitive Skill Development, Manual Therapy Techniques, Neuro Re-Education / Balance, Self Care/Activities of Daily Living, Therapeutic Activities and Therapeutic Exercises.        Subjective    \"How am I supposed to get this thing on by myself?\"     Objective       03/07/21 0934   Prior Living Situation   Prior Services None   Housing / Facility Motor Home   Steps Into Home 5   Steps In Home 5   Equipment Owned None   Lives with - Patient's Self Care Capacity Alone and Able to Care For Self   Comments Patient reports that he will be staying with a friend at [place] - the friend works full time, but their home has fewer steps to enter and has a flat walk in shower.    Prior Level of ADL " Function   Comments Ind all   Prior Level of IADL Function   Comments Ind all, though does not drive   Precautions   Precautions Fall Risk;Spinal / Back Precautions ;TLSO (Thoracolumbosacral orthosis)   Comments custom TLSO donned at EOB, difficult fit.    Cognition    Cognition / Consciousness X   Speech/ Communication Hard of Hearing   Level of Consciousness Alert   Comments Pleasant and cooperative   Balance Assessment   Sitting Balance (Static) Fair +   Sitting Balance (Dynamic) Fair +   Standing Balance (Static) Fair +   Standing Balance (Dynamic) Fair   Weight Shift Sitting Good   Weight Shift Standing Fair   Bed Mobility    Supine to Sit Supervised   ADL Assessment   Eating Supervision   Upper Body Dressing Supervision   Lower Body Dressing Maximal Assist  (for TLSO)   Functional Mobility   Sit to Stand Supervised   Bed, Chair, Wheelchair Transfer Minimal Assist   Transfer Method Stand Pivot   Mobility sup>sit, STS   Activity Tolerance   Sitting in Chair 10+ mins   Sitting Edge of Bed 10 mins   Standing 5 mins

## 2021-03-07 NOTE — PROGRESS NOTES
Hospital Medicine Daily Progress Note    Date of Service  3/7/2021    Chief Complaint  55 y.o. male admitted 3/5/2021 with seizure and T12 compression fracture    Hospital Course  Mr. Styles is a pleasant 55-year-old gentleman with a past medical history of epilepsy that started 10 years ago after traumatic brain injury.  Reports that his seizures have never been well controlled despite many medication changes he is followed by Dr. Alxe Christian of neurology at [facility].  He was traveling back from [facility] on 3/5/2021 when he had to back-to-back tonic-clonic seizures in the vehicle.  Following this he developed severe low back pain and was unable to ambulate so he came to the ER in [place] where he was found to have compression fracture of T12.  He was transferred here for further evaluation.  Here he was evaluated by neurosurgery Dr. Ki Mcfarlane who recommended a TLSO brace and every 4 hours neurochecks.  No surgical intervention is planned at this time.       Interval Problem Update  Patient seen and examined at bedside, still with low back pain, as well as some inner left thigh pain described as cramping.  No tenderness on palpation or overlying erythema, suspect muscle strain.  -TLSO brace is at bedside, patient physical therapy referral recommended by PT, OT is pending  -Supratherapeutic valproic acid level this morning of 134.2, will hold his p.m. dose, and recheck level tomorrow morning  -May need neurology consult tomorrow for recommendations for antiepileptics, and refractory seizure  -He has not had a seizure since admission.  He states he has never had good control of his seizures  -Anticipate possible discharge home tomorrow    Consultants/Specialty  Neurosurgery    Code Status  Full Code    Disposition  Anticipate discharge home tomorrow    Review of Systems  Review of Systems   Constitutional: Negative for chills, fever and malaise/fatigue.   HENT: Negative for congestion and sore throat.    Eyes: Negative for  blurred vision.   Respiratory: Negative for cough and shortness of breath.    Cardiovascular: Negative for chest pain and leg swelling.   Gastrointestinal: Negative for abdominal pain, nausea and vomiting.   Genitourinary: Negative for dysuria.   Musculoskeletal: Positive for back pain.   Neurological: Negative for dizziness, speech change, focal weakness and seizures.   Psychiatric/Behavioral: Negative for depression. The patient is not nervous/anxious.         Physical Exam  Temp:  [35.7 °C (96.2 °F)-36.4 °C (97.6 °F)] 36.1 °C (97 °F)  Pulse:  [73-90] 85  Resp:  [15-16] 16  BP: (117-129)/(76-89) 129/80  SpO2:  [93 %-96 %] 96 %    Physical Exam  Vitals and nursing note reviewed.   Constitutional:       General: He is not in acute distress.     Appearance: Normal appearance. He is obese. He is not ill-appearing.   HENT:      Head: Normocephalic and atraumatic.      Mouth/Throat:      Mouth: Mucous membranes are dry.      Pharynx: Oropharynx is clear.      Comments: Poor oral dentition  Eyes:      Extraocular Movements: Extraocular movements intact.      Conjunctiva/sclera: Conjunctivae normal.   Cardiovascular:      Rate and Rhythm: Normal rate and regular rhythm.      Pulses: Normal pulses.   Pulmonary:      Effort: Pulmonary effort is normal.      Breath sounds: Normal breath sounds.   Abdominal:      General: Bowel sounds are normal.      Palpations: Abdomen is soft.   Musculoskeletal:         General: Tenderness present.      Cervical back: Normal range of motion.      Right lower leg: No edema.      Left lower leg: No edema.      Comments: Mobility improved   Skin:     Capillary Refill: Capillary refill takes less than 2 seconds.   Neurological:      General: No focal deficit present.      Mental Status: He is alert and oriented to person, place, and time. Mental status is at baseline.      Cranial Nerves: No cranial nerve deficit.      Deep Tendon Reflexes: Reflexes normal.   Psychiatric:         Mood and  Affect: Mood normal.         Behavior: Behavior normal.         Fluids    Intake/Output Summary (Last 24 hours) at 3/7/2021 1200  Last data filed at 3/7/2021 0200  Gross per 24 hour   Intake 550 ml   Output 650 ml   Net -100 ml       Laboratory  Recent Labs     03/06/21  0331   WBC 10.7   RBC 4.28*   HEMOGLOBIN 15.0   HEMATOCRIT 42.9   .2*   MCH 35.0*   MCHC 35.0   RDW 46.5   PLATELETCT 158*   MPV 9.8     Recent Labs     03/06/21  0331   SODIUM 137   POTASSIUM 3.7   CHLORIDE 104   CO2 22   GLUCOSE 104*   BUN 12   CREATININE 0.74   CALCIUM 8.9                   Imaging  OUTSIDE IMAGES-CT ABDOMEN /PELVIS   Final Result      OUTSIDE IMAGES-CT LUMBAR SPINE   Final Result           Assessment/Plan  * Compression fracture of body of thoracic vertebra (HCC)  Assessment & Plan  Patient presents with low back pain , ridging shows T12 compression fracture can Bishnu to trauma from seizure activity  Patient is neurovascularly intact  Neurosurgery recommending TLSO brace and outpatient follow-up  Supportive care with pain control  PT OT    Seizure (HCC)  Assessment & Plan  Patient has a long history of epilepsy after a traumatic brain injury approximately 10 years ago, he follows with neurology at [facility] .  He recently had medication changes .  Patient is a poor historian and is unable to explicitly tell me the doses of his current medication regiment.  He states that he has never had good control in the long as he is ever been without a seizure is 2 months.  States that he usually has seizures approximately once a week.  He does not drive  Mri at [facility] yesterday, did attempt to reach his neurologist at [facility] however this was unsuccessful and the office is not open on the weekends  Do not feel at this time that further imaging is necessary as he just had an MRI the day of admission  Depakote level is supratherapeutic at 134, Seg[place] p.m. dose with repeat level in the morning  May need to consult inpatient neurology for  recommendations on antiepileptic regiment and patient's report of refractory seizure  Continue his home antiepileptic regiment  As needed IV Ativan for seizure activity  Seizure and fall precautions    Elevated liver enzymes  Assessment & Plan  [place] labs, mild elevation of AST at 42 and t bili at 1.6  Resolved    Leukocytosis  Assessment & Plan  Mild, 12.9 at [place]  Likely reactive  Resolved    Hearing loss  Assessment & Plan  chronic       VTE prophylaxis: Lovenox

## 2021-03-08 VITALS
HEIGHT: 72 IN | TEMPERATURE: 97.5 F | BODY MASS INDEX: 40.31 KG/M2 | WEIGHT: 297.62 LBS | DIASTOLIC BLOOD PRESSURE: 99 MMHG | SYSTOLIC BLOOD PRESSURE: 146 MMHG | OXYGEN SATURATION: 96 % | HEART RATE: 84 BPM | RESPIRATION RATE: 18 BRPM

## 2021-03-08 LAB — VALPROATE SERPL-MCNC: 113.9 UG/ML (ref 50–100)

## 2021-03-08 PROCEDURE — 97535 SELF CARE MNGMENT TRAINING: CPT | Mod: CO

## 2021-03-08 PROCEDURE — 99239 HOSP IP/OBS DSCHRG MGMT >30: CPT | Performed by: STUDENT IN AN ORGANIZED HEALTH CARE EDUCATION/TRAINING PROGRAM

## 2021-03-08 PROCEDURE — 80164 ASSAY DIPROPYLACETIC ACD TOT: CPT

## 2021-03-08 PROCEDURE — A9270 NON-COVERED ITEM OR SERVICE: HCPCS | Performed by: HOSPITALIST

## 2021-03-08 PROCEDURE — 96372 THER/PROPH/DIAG INJ SC/IM: CPT

## 2021-03-08 PROCEDURE — 97116 GAIT TRAINING THERAPY: CPT

## 2021-03-08 PROCEDURE — 700111 HCHG RX REV CODE 636 W/ 250 OVERRIDE (IP): Performed by: HOSPITALIST

## 2021-03-08 PROCEDURE — 700102 HCHG RX REV CODE 250 W/ 637 OVERRIDE(OP): Performed by: HOSPITALIST

## 2021-03-08 RX ORDER — ACETAMINOPHEN AND CODEINE PHOSPHATE 300; 30 MG/1; MG/1
1 TABLET ORAL EVERY 6 HOURS PRN
Qty: 20 TABLET | Refills: 0 | Status: SHIPPED | OUTPATIENT
Start: 2021-03-08 | End: 2021-03-13

## 2021-03-08 RX ORDER — ACETAMINOPHEN 500 MG
500 TABLET ORAL EVERY 6 HOURS PRN
COMMUNITY
Start: 2021-03-08

## 2021-03-08 RX ORDER — HYDROCODONE BITARTRATE AND ACETAMINOPHEN 5; 325 MG/1; MG/1
1 TABLET ORAL EVERY 8 HOURS PRN
Qty: 9 TABLET | Refills: 0 | Status: SHIPPED | OUTPATIENT
Start: 2021-03-08 | End: 2021-03-08

## 2021-03-08 RX ADMIN — FLUTICASONE PROPIONATE 100 MCG: 50 SPRAY, METERED NASAL at 04:59

## 2021-03-08 RX ADMIN — ACETAMINOPHEN 650 MG: 325 TABLET, FILM COATED ORAL at 09:04

## 2021-03-08 RX ADMIN — BUSPIRONE HYDROCHLORIDE 10 MG: 10 TABLET ORAL at 04:59

## 2021-03-08 RX ADMIN — ENOXAPARIN SODIUM 40 MG: 40 INJECTION SUBCUTANEOUS at 04:59

## 2021-03-08 RX ADMIN — LACOSAMIDE 300 MG: 100 TABLET, FILM COATED ORAL at 04:59

## 2021-03-08 RX ADMIN — DOCUSATE SODIUM 50 MG AND SENNOSIDES 8.6 MG 2 TABLET: 8.6; 5 TABLET, FILM COATED ORAL at 04:59

## 2021-03-08 ASSESSMENT — COGNITIVE AND FUNCTIONAL STATUS - GENERAL
STANDING UP FROM CHAIR USING ARMS: A LITTLE
DAILY ACTIVITIY SCORE: 21
MOVING TO AND FROM BED TO CHAIR: A LITTLE
CLIMB 3 TO 5 STEPS WITH RAILING: A LITTLE
MOVING FROM LYING ON BACK TO SITTING ON SIDE OF FLAT BED: A LITTLE
SUGGESTED CMS G CODE MODIFIER MOBILITY: CK
WALKING IN HOSPITAL ROOM: A LITTLE
DRESSING REGULAR UPPER BODY CLOTHING: A LITTLE
HELP NEEDED FOR BATHING: A LITTLE
SUGGESTED CMS G CODE MODIFIER DAILY ACTIVITY: CJ
TOILETING: A LITTLE
MOBILITY SCORE: 19

## 2021-03-08 ASSESSMENT — GAIT ASSESSMENTS
DISTANCE (FEET): 100
GAIT LEVEL OF ASSIST: MODIFIED INDEPENDENT
ASSISTIVE DEVICE: FRONT WHEEL WALKER

## 2021-03-08 ASSESSMENT — PAIN DESCRIPTION - PAIN TYPE
TYPE: ACUTE PAIN
TYPE: ACUTE PAIN

## 2021-03-08 NOTE — DISCHARGE INSTRUCTIONS
Discharge Instructions    Discharged to home by taxi with self. Discharged via wheelchair, hospital escort: Yes.  Special equipment needed: TLSO    Be sure to schedule a follow-up appointment with your primary care doctor or any specialists as instructed.     Discharge Plan:   Diet Plan: Discussed  Activity Level: Discussed  Confirmed Follow up Appointment: Patient to Call and Schedule Appointment  Confirmed Symptoms Management: Discussed  Medication Reconciliation Updated: Yes  Influenza Vaccine Indication: Not indicated: Previously immunized this influenza season and > 8 years of age    I understand that a diet low in cholesterol, fat, and sodium is recommended for good health. Unless I have been given specific instructions below for another diet, I accept this instruction as my diet prescription.   Other diet: regular    Special Instructions: None    · Is patient discharged on Warfarin / Coumadin?   No     Depression / Suicide Risk    As you are discharged from this [facility] facility, it is important to learn how to keep safe from harming yourself.    Recognize the warning signs:  · Abrupt changes in personality, positive or negative- including increase in energy   · Giving away possessions  · Change in eating patterns- significant weight changes-  positive or negative  · Change in sleeping patterns- unable to sleep or sleeping all the time   · Unwillingness or inability to communicate  · Depression  · Unusual sadness, discouragement and loneliness  · Talk of wanting to die  · Neglect of personal appearance   · Rebelliousness- reckless behavior  · Withdrawal from people/activities they love  · Confusion- inability to concentrate     If you or a loved one observes any of these behaviors or has concerns about self-harm, here's what you can do:  · Talk about it- your feelings and reasons for harming yourself  · Remove any means that you might use to hurt yourself (examples: pills, rope, extension cords,  firearm)  · Get professional help from the community (Mental Health, Substance Abuse, psychological counseling)  · Do not be alone:Call your Safe Contact- someone whom you trust who will be there for you.  · Call your local CRISIS HOTLINE 541-7412 or 984-754-6358  · Call your local Children's Mobile Crisis Response Team [place] (939) 615-5108 or www.[employer].com  · Call the toll free National Suicide Prevention Hotlines   · National Suicide Prevention Lifeline 509-779-DHOA (8123)  · [employer] Line Network 800-SUICIDE (223-8832)        Keep back brace on, okay to take off while sleeping and bathing, information for follow up appointment with neurosurgery was provided  Take medication as provided   Recommend that you follow up with your neurologist and let him know of your hospitalization so that medication changes can be made if needed   Spinal Compression Fracture       A spinal compression fracture is a collapse of the bones that form the spine (vertebrae). With this type of fracture, the vertebrae become pushed (compressed) into a wedge shape. Most compression fractures happen in the middle or lower part of the spine.  What are the causes?  This condition may be caused by:  · Thinning and loss of density in the bones (osteoporosis). This is the most common cause.  · A fall.  · A car or motorcycle accident.  · Cancer.  · Trauma, such as a heavy, direct hit to the head or back.  What increases the risk?  You are more likely to develop this condition if:  · You are 60 years or older.  · You have osteoporosis.  · You have certain types of cancer, including:  ? Multiple myeloma.  ? Lymphoma.  ? Prostate cancer.  ? Lung cancer.  ? Breast cancer.  What are the signs or symptoms?  Symptoms of this condition include:  · Severe pain.  · Pain that gets worse over time.  · Pain that is worse when you stand, walk, sit, or bend.  · Sudden pain that is so bad that it is hard for you to move.  · Bending or humping  of the spine.  · Gradual loss of height.  · Numbness, tingling, or weakness in the back and legs.  · Trouble walking.  Your symptoms will depend on the cause of the fracture and how quickly it develops.  How is this diagnosed?  This condition may be diagnosed based on symptoms, medical history, and a physical exam. During the physical exam, your health care provider may tap along the length of your spine to check for tenderness. Tests may be done to confirm the diagnosis. They may include:  · A bone mineral density test to check for osteoporosis.  · Imaging tests, such as a spine X-ray, CT scan, or MRI.  How is this treated?  Treatment for this condition depends on the cause and severity of the condition. Some fractures may heal on their own with supportive care. Treatment may include:  · Pain medicine.  · Rest.  · A back brace.  · Physical therapy exercises.  · Medicine to strengthen bone.  · Calcium and vitamin D supplements.  Fractures that cause the back to become misshapen, cause nerve pain or weakness, or do not respond to other treatment may be treated with surgery. This may include:  · Vertebroplasty. Bone cement is injected into the collapsed vertebrae to stabilize them.  · Balloon kyphoplasty. The collapsed vertebrae are expanded with a balloon and then bone cement is injected into them.  · Spinal fusion. The collapsed vertebrae are connected (fused) to normal vertebrae.  Follow these instructions at home:  Medicines  · Take over-the-counter and prescription medicines only as told by your health care provider.  · Do not drive or operate heavy machinery while taking prescription pain medicine.  · If you are taking prescription pain medicine, take actions to prevent or treat constipation. Your health care provider may recommend that you:  ? Drink enough fluid to keep your urine pale yellow.  ? Eat foods that are high in fiber, such as fresh fruits and vegetables, whole grains, and beans.  ? Limit foods that  are high in fat and processed sugars, such as fried or sweet foods.  ? Take an over-the-counter or prescription medicine for constipation.  If you have a brace:  · Wear the brace as told by your health care provider. Remove it only as told by your health care provider.  · Loosen the brace if your fingers or toes tingle, become numb, or turn cold and blue.  · Keep the brace clean.  · If the brace is not waterproof:  ? Do not let it get wet.  ? Cover it with a watertight covering when you take a bath or a shower.  Managing pain, stiffness, and swelling       · If directed, apply ice to the injured area:  ? If you have a removable brace, remove it as told by your health care provider.  ? Put ice in a plastic bag.  ? Place a towel between your skin and the bag.  ? Leave the ice on for 30 minutes every two hours at first. Then apply the ice as needed.  Activity  · Rest as told by your health care provider.  ? Avoid sitting for a long time without moving. Get up to take short walks every 1-2 hours. This is important to improve blood flow and breathing. Ask for help if you feel weak or unsteady.  · Return to your normal activities as directed by your health care provider. Ask what activities are safe for you.  · Do exercises to improve motion and strength in your back (physical therapy), as recommended by your health care provider.  · Exercise regularly as directed by your health care provider.  General instructions       · Do not drink alcohol. Alcohol can interfere with your treatment.  · Do not use any products that contain nicotine or tobacco, such as cigarettes and e-cigarettes. These can delay bone healing. If you need help quitting, ask your health care provider.  · Keep all follow-up visits as told by your health care provider. This is important. It can help to prevent permanent injury, disability, and long-lasting (chronic) pain.  Contact a health care provider if:  · You have a fever.  · You develop a cough that  makes your pain worse.  · Your pain medicine is not helping.  · Your pain does not get better over time.  · You cannot return to your normal activities as planned or expected.  Get help right away if:  · Your pain is very bad and it suddenly gets worse.  · You are unable to move any body part (paralysis) that is below the level of your injury.  · You have numbness, tingling, or weakness in any body part that is below the level of your injury.  · You cannot control your bladder or bowels.  Summary  · A spinal compression fracture is a collapse of the bones that form the spine (vertebrae).  · With this type of fracture, the vertebrae become pushed (compressed) into a wedge shape.  · Your symptoms and treatment will depend on the cause and severity of the fracture and how quickly it develops.  · Some fractures may heal on their own with supportive care. Fractures that cause the back to become misshapen, cause nerve pain or weakness, or do not respond to other treatment may be treated with surgery.  This information is not intended to replace advice given to you by your health care provider. Make sure you discuss any questions you have with your health care provider.  Document Released: 12/18/2006 Document Revised: 02/13/2020 Document Reviewed: 01/29/2019  Elsevier Patient Education © 2020 [employer]

## 2021-03-08 NOTE — PROGRESS NOTES
Patient's IV removed and discharge instructions given. Patient discharged to home via [employer] with TLSO and FWW.

## 2021-03-08 NOTE — CARE PLAN
Problem: Communication  Goal: The ability to communicate needs accurately and effectively will improve  Outcome: PROGRESSING AS EXPECTED     Problem: Safety  Goal: Will remain free from falls  Outcome: PROGRESSING AS EXPECTED     Problem: Venous Thromboembolism (VTW)/Deep Vein Thrombosis (DVT) Prevention:  Goal: Patient will participate in Venous Thrombosis (VTE)/Deep Vein Thrombosis (DVT)Prevention Measures  Outcome: PROGRESSING AS EXPECTED     Problem: Pain Management  Goal: Pain level will decrease to patient's comfort goal  Outcome: PROGRESSING AS EXPECTED  Note: Use pharm and non pharm interventions for pain management. Educate patient on scales used and interventions to help.

## 2021-03-08 NOTE — CARE PLAN
Problem: Safety  Goal: Will remain free from falls  Outcome: PROGRESSING AS EXPECTED  Note: Pt. SBA with FWW to ambulate. Pt. Calls appropriately. Bed locked and in lowest position with bed and frame alarm on. Room clean and free of clutter, well lit, call light within reach.      Problem: Discharge Barriers/Planning  Goal: Patient's continuum of care needs will be met  Outcome: PROGRESSING AS EXPECTED  Note: PT/OT have seen Pt.and made outpatient recommendations. Pt. Still has high levels of valproic acid, but levels are trending down. MD aware, will continue to monitor.

## 2021-03-08 NOTE — DISCHARGE PLANNING
Received Transport Form @ 1121  Spoke to Tisha @ [facility]    Transport is scheduled for 3/8 @5305-5074 going friend's home: [address] in [place], [place].  Trip #[id number]  Quote $977.21    Received Choice form at 1136  Agency/Facility Name: [facility]  Referral sent per Choice form @ 1211     1212  Per RN NADIYA Barriga to cancel [facility] transport. [employer] will be arranged.  NADIYA spoke to Tank at [facility] to cancel transport.

## 2021-03-08 NOTE — DISCHARGE PLANNING
Anticipated Discharge Disposition:   Friend Yaneli's house in [facility]  Transportation--[employer]---approved services  Outpatient PT/OT    Action:    Request to Hospital Care Management for [employer].  Approved.      Voalte msalmas to bedside RN Nusrat in [facility] to please call [phone] to initiate [employer].    Barriers to Discharge:    None    Plan:    Wait for Uber.

## 2021-03-08 NOTE — THERAPY
Physical Therapy   Daily Treatment     Patient Name: Mac Styles  Age:  55 y.o., Sex:  male  Medical Record #: [id number]  Today's Date: 3/8/2021     Precautions: Fall Risk, Spinal / Back Precautions     Assessment    Pt performed well with PT today. Good attention to spinal precautions with bed mobility, transfers, and gait. Pt able to yasmine TLSO in sitting EOB with supervision. Tolerated amb 2x100' with FWW mod I, and performed 2x2 steps with supv and railing. Pt demonstrating adequate safety and mobility to discharge home. Recommend further PT in the outpatient setting pending physician approval.    Plan    Continue current treatment plan.    DC Equipment Recommendations: (P) Front-Wheel Walker  Discharge Recommendations: (P) Recommend outpatient physical therapy services to address higher level deficits      Subjective    Agreeable to work with PT. States no concerns about D/C. States he plans to stay at friend's home for a while.     Objective       03/08/21 0930   Pain 0 - 10 Group   Location Back   Therapist Pain Assessment 6;During Activity;8;Post Activity Pain Same as Prior to Activity  (8 with seated rest, decreased to 6 with amb)   Cognition    Speech/ Communication Hard of Hearing   Level of Consciousness Alert   Comments pleasant, participatory, good attention to safety   Neurological Concerns   Comments hx of seizure   Balance   Sitting Balance (Static) Fair +   Sitting Balance (Dynamic) Fair +   Standing Balance (Static) Fair +   Standing Balance (Dynamic) Fair   Weight Shift Sitting Good   Weight Shift Standing Fair   Skilled Intervention Verbal Cuing   Comments with FWW   Gait Analysis   Gait Level Of Assist Modified Independent   Assistive Device Front Wheel Walker   Distance (Feet) 100   # of Times Distance was Traveled 2   Deviation   (moderate pace)   # of Stairs Climbed 4  (step to with rail)   Level of Assist with Stairs Supervised   Weight Bearing Status FWB B LE   Skilled Intervention Verbal  Cuing   Comments cues for pacing per pt question about appropriate gait pace   Bed Mobility    Supine to Sit Supervised  (flat bed)   Scooting Supervised   Rolling Supervised   Skilled Intervention Verbal Cuing   Comments good attention to safety with log roll; reviewed spinal precautions   Functional Mobility   Sit to Stand Supervised   Bed, Chair, Wheelchair Transfer Supervised   Transfer Method Stand Step   Mobility supine->sit EOB->stand->amb->stairs->sit->amb->sit   Comments TLSO donned EOB with supv   How much help from another person does the patient currently need...   6 clicks Mobility Score 19   Activity Tolerance   Sitting in Chair left up in chair    Sitting Edge of Bed 5 minutes   Standing 8 minutes   Patient / Family Goals    Patient / Family Goal #1 go home   Goal #1 Outcome Progressing as expected   Short Term Goals    Short Term Goal # 1 Pt will demonstrate supine<>sit EOB w/ log roll and HOB flat spv in 6 visits for functional mobility.   Goal Outcome # 1 Goal met   Short Term Goal # 2 Pt will demonstrate understanding of spinal precautions in 3 visits for proper healing of fx.   Goal Outcome # 2 Goal met   Short Term Goal # 3 Pt will demonstrate ability to ambulate 200' spv using FWW in 6 visits for community ambulation.   Goal Outcome # 3 Progressing as expected   Short Term Goal # 4 Pt will demonstrate ability to ascend/descend 5 stairs spv w/ B UE support on railing in 6 visits for mobility into/out of his home.   Goal Outcome # 4 Progressing as expected   Education Group   Spine Precautions Patient Response Patient;Acceptance;Explanation;Verbal Demonstration   Stair Training Patient Response Patient;Acceptance;Explanation;Verbal Demonstration;Action Demonstration   Brace Wear & Care Patient Response Patient;Acceptance;Explanation;Verbal Demonstration;Action Demonstration   Anticipated Discharge Equipment and Recommendations   DC Equipment Recommendations Front-Wheel Walker   Discharge  Recommendations Recommend outpatient physical therapy services to address higher level deficits

## 2021-03-08 NOTE — DISCHARGE SUMMARY
Discharge Summary    CHIEF COMPLAINT ON ADMISSION  Chief Complaint   Patient presents with   • Seizure     Pt BIB EMS, transfer from [facility] for seizures (tonic clonic). pt with hx of SZ/epilepsy. pt found to have retropulsion of T-12/L-1, pt A&O3-4        Reason for Admission  T-11 to L-2 compression fxs, epile*     Admission Date  3/5/2021    CODE STATUS  Full Code    HPI & HOSPITAL COURSE  This is a 55 y.o. male here with a past medical history of epilepsy that started 10 years ago after traumatic brain injury.  Patient reports refractory epilepsy, he is followed by Dr. Alex Christian of neurology at [facility].  On day of admission, he was traveling back from [facility] on 3/5/2021 when he had to back-to-back tonic-clonic seizures in the vehicle.  Following this he developed severe low back pain and was unable to ambulate so he came to the ER in [place] where he was found to have compression fracture of T12.  He was transferred here for further evaluation.  Here he was evaluated by neurosurgery Dr. Ki Mcfarlane who recommended a TLSO brace and every 4 hours neuro checks, without plan for surgical intervention.  During admission he had no neurological deficits, his pain was improved. was evaluated by physical and Occupational Therapy who recommended outpatient referrals, as patient lives in [place] these referrals were given to them on a paper prescription.    During admission we did check patient's valproic acid level which was supra-therapeutic, his Depakote was held with improvement in that level however still elevated.  Did briefly discuss with in-house neurology.  Since patient has no toxic side effects, is followed closely by [facility] neurology, and has refractory epilepsy, he will be discharged on his home regiment.  I did personally try to reach out to his neurologist several times without success.     Patient was educated on following up with his neurologist.  He was also given referral to follow-up with  neurosurgery in 1 month for evaluation and monitoring of his thoracic/lumbar compression fracture.     Therefore, he is discharged in fair and stable condition to home with close outpatient follow-up.    The patient met 2-midnight criteria for an inpatient stay at the time of discharge.    Discharge Date  3/8/2021    FOLLOW UP ITEMS POST DISCHARGE  Follow-up with neurosurgery in 1 month for compression fracture  Follow-up with neurology for antiepileptic medications and adjustments  Follow-up on referrals to occupational and physical therapy    DISCHARGE DIAGNOSES  Principal Problem:    Compression fracture of body of thoracic vertebra (HCC) POA: Unknown  Active Problems:    Seizure (HCC) POA: Unknown    Hearing loss POA: Unknown    Leukocytosis POA: Unknown    Elevated liver enzymes POA: Unknown  Resolved Problems:    * No resolved hospital problems. *      FOLLOW UP  No future appointments.  [facility]  [address]  [facility] [address]  [phone]  In 4 weeks  [facility]     [facility]  [address]  [facility] [address]  [phone]  In 4 weeks        MEDICATIONS ON DISCHARGE     Medication List      START taking these medications      Instructions   acetaminophen 500 MG Tabs  Commonly known as: TYLENOL   Take 1 tablet by mouth every 6 hours as needed for Mild Pain or Moderate Pain.  Dose: 500 mg     Acetaminophen-Codeine 300-30 MG Tabs  Commonly known as: TYLENOL/CODEINE #3   Take 1 tablet by mouth every 6 hours as needed for up to 5 days.  Dose: 1 tablet        CONTINUE taking these medications      Instructions   Aptiom 600 MG Tabs  Generic drug: eslicarbazepine   Take 1,200 mg by mouth every bedtime.  Dose: 1,200 mg     busPIRone 5 MG tablet  Commonly known as: BUSPAR   Take 10 mg by mouth 2 Times a Day.  Dose: 10 mg     divalproex  MG Tb24  Commonly known as: DEPAKOTE ER   Take 3,000 mg by mouth 2 Times a  Day.  Dose: 3,000 mg     escitalopram 20 MG tablet  Commonly known as: LEXAPRO   Take 20 mg by mouth every bedtime.  Dose: 20 mg     fluticasone 50 MCG/ACT nasal spray  Commonly known as: FLONASE   Administer 2 Sprays into affected nostril(S) every morning.  Dose: 2 Spray     lacosamide 200 MG Tabs tablet  Commonly known as: VIMPAT   Take 300 mg by mouth 2 Times a Day.  Dose: 300 mg            Allergies  Allergies   Allergen Reactions   • Aspirin Swelling     Face swelling     • Pineapple Rash     Rash in mouth     • Toradol Swelling     Face swelling         DIET  Orders Placed This Encounter   Procedures   • Diet Order Diet: Regular     Standing Status:   Standing     Number of Occurrences:   1     Order Specific Question:   Diet:     Answer:   Regular [1]       ACTIVITY  As tolerated.      CONSULTATIONS  Neurosurgery    PROCEDURES  N/A    LABORATORY  Lab Results   Component Value Date    SODIUM 137 03/06/2021    POTASSIUM 3.7 03/06/2021    CHLORIDE 104 03/06/2021    CO2 22 03/06/2021    GLUCOSE 104 (H) 03/06/2021    BUN 12 03/06/2021    CREATININE 0.74 03/06/2021        Lab Results   Component Value Date    WBC 10.7 03/06/2021    HEMOGLOBIN 15.0 03/06/2021    HEMATOCRIT 42.9 03/06/2021    PLATELETCT 158 (L) 03/06/2021        Total time of the discharge process exceeds 35 minutes.

## 2021-03-08 NOTE — DISCHARGE PLANNING
Anticipated Discharge Disposition:   Friend Yaneli's house in [facility]  Transportation--GMT---approved services  Outpatient PT/OT    Action:    Pt admitted with compression fracture of thoracic vertebra, seizure, hx epilepsy, chronic hearing loss.    RN GUILLERMINA spoke with patient.  He was sitting up in a chair wearing custom TLSO.  Pt stated that he lives alone in a 5th wheel in [facility].  He plans to stay with his friend Yaneli at her house in [place].  He is requesting a ride home.  Transport form faxed to [employer].  Discussed FWW and patient agreeable.  Choice form faxed to [employer].    PT recommending outpatient PT and FWW.  Doctor Alia wrote a prescription and it was given to bedside RNRohini.    Voalte msg to Dr. Rojo for a FWW order.    Barriers to Discharge:    Transportation    Plan:    Wait for transportation confirmation.    Care Transition Team Assessment    Information Source  Orientation : Oriented x 4  Information Given By: Patient  Who is responsible for making decisions for patient? : Patient    Readmission Evaluation  Is this a readmission?: No    Elopement Risk  Legal Hold: No  Ambulatory or Self Mobile in Wheelchair: Yes  Disoriented: No  Psychiatric Symptoms: None  History of Wandering: No  Elopement this Admit: No  Vocalizing Wanting to Leave: No  Displays Behaviors, Body Language Wanting to Leave: No-Not at Risk for Elopement  Elopement Risk: Not at Risk for Elopement    Interdisciplinary Discharge Planning  Lives with - Patient's Self Care Capacity: Alone and Able to Care For Self  Patient or legal guardian wants to designate a caregiver: No  Support Systems: Friends / Neighbors  Housing / Facility: Motor Home  Prior Services: None  Patient Prefers to be Discharged to:: home  Durable Medical Equipment: Not Applicable    Discharge Preparedness  What is your plan after discharge?: Other (comment)  What are your discharge supports?: Other (comment)  Prior Functional Level: Ambulatory,  Independent with Activities of Daily Living, Independent with Medication Management  Difficulity with ADLs: Bathing, Dressing, Walking  Difficulity with IADLs: Cooking, Driving, Laundry, Shopping    Functional Assesment  Prior Functional Level: Ambulatory, Independent with Activities of Daily Living, Independent with Medication Management    Finances  Financial Barriers to Discharge: No  Prescription Coverage: Yes    Vision / Hearing Impairment  Vision Impairment : Yes(reading glasses)  Hearing Impairment : Yes  Hearing Impairment: Hearing Device Not Available         Advance Directive  Advance Directive?: None    Domestic Abuse  Have you ever been the victim of abuse or violence?: No  Physical Abuse or Sexual Abuse: No  Verbal Abuse or Emotional Abuse: No  Possible Abuse/Neglect Reported to:: Not Applicable         Discharge Risks or Barriers  Discharge risks or barriers?: No    Anticipated Discharge Information  Discharge Disposition: Discharged to home/self care (01)  Discharge Address: [address] [place] [address] [address] [facility]  [id number]  Discharge Contact Phone Number: [phone]

## 2021-03-08 NOTE — THERAPY
Occupational Therapy  Daily Treatment     Patient Name: Mac Styles  Age:  55 y.o., Sex:  male  Medical Record #: [id number]  Today's Date: 3/8/2021       Precautions: Fall Risk, Spinal / Back Precautions   Comments: custom TLSO donned EOB    Assessment    Pt seen for OT tx. Continues to be limited by pain impacting ability to complete ADLs and ADL transfers independently. Pt required min A to don/doff TLSO, educated on different compensatory strategies to increase independence in donning. Discussed compensatory strategies for pericare post-BM while maintaining spinal precautions. Pt pleasant and cooperative. Able to direct care on TLSO and reports he will have assistance as needed from friend upon appropriate medical d/c.     Plan    Continue current treatment plan.               03/08/21 0845   Cognition    Cognition / Consciousness X   Speech/ Communication Hard of Hearing   Comments pleasant and cooperative    Balance   Sitting Balance (Static) Fair +   Sitting Balance (Dynamic) Fair +   Standing Balance (Static) Fair   Standing Balance (Dynamic) Fair   Weight Shift Sitting Good   Weight Shift Standing Fair   Bed Mobility    Supine to Sit Supervised   Sit to Supine Supervised   Scooting Supervised   Rolling Supervised   Activities of Daily Living   Grooming Supervision;Standing   Upper Body Dressing Minimal Assist  (TLSO )   Lower Body Dressing Minimal Assist   Toileting Supervision   Comments compensatory strategies for pericare post BM while maintaining spinal precautions. Min A donning TLSO w/ compensatory strategies to increase independence in donning/doffing    Functional Mobility   Sit to Stand Supervised   Bed, Chair, Wheelchair Transfer Supervised   Toilet Transfers Supervised   Short Term Goals   Short Term Goal # 1 Patient will don brace with supv or educate friend on how to help   Goal Outcome # 1 Progressing as expected

## 2021-03-08 NOTE — PROGRESS NOTES
Lab called for a critical clab of valprioc acid of 113.9. MD notified and depakote already being held.

## 2021-03-08 NOTE — CARE PLAN
Problem: Communication  Goal: The ability to communicate needs accurately and effectively will improve  Outcome: PROGRESSING AS EXPECTED     Problem: Safety  Goal: Will remain free from injury  Outcome: PROGRESSING AS EXPECTED  Goal: Will remain free from falls  Outcome: PROGRESSING AS EXPECTED  Note: Free of falls.  Fall precautions in place.  Patient using call light appropriately.       Problem: Infection  Goal: Will remain free from infection  Outcome: PROGRESSING AS EXPECTED     Problem: Venous Thromboembolism (VTW)/Deep Vein Thrombosis (DVT) Prevention:  Goal: Patient will participate in Venous Thrombosis (VTE)/Deep Vein Thrombosis (DVT)Prevention Measures  Outcome: PROGRESSING AS EXPECTED     Problem: Bowel/Gastric:  Goal: Normal bowel function is maintained or improved  Outcome: PROGRESSING AS EXPECTED  Goal: Will not experience complications related to bowel motility  Outcome: PROGRESSING AS EXPECTED     Problem: Knowledge Deficit  Goal: Knowledge of disease process/condition, treatment plan, diagnostic tests, and medications will improve  Outcome: PROGRESSING AS EXPECTED  Goal: Knowledge of the prescribed therapeutic regimen will improve  Outcome: PROGRESSING AS EXPECTED     Problem: Discharge Barriers/Planning  Goal: Patient's continuum of care needs will be met  Outcome: PROGRESSING AS EXPECTED     Problem: Pain Management  Goal: Pain level will decrease to patient's comfort goal  Outcome: PROGRESSING AS EXPECTED  Note: Pain controlled with PRN Norco.      Problem: Fluid Volume:  Goal: Will maintain balanced intake and output  Outcome: PROGRESSING AS EXPECTED     Problem: Safety:  Goal: Will remain free from injury  Outcome: PROGRESSING AS EXPECTED  Goal: Encourage identification and reduction of causative stimuli  Outcome: PROGRESSING AS EXPECTED     Problem: Psychosocial Needs:  Goal: Ability to verbalize feelings about condition will improve  Outcome: PROGRESSING AS EXPECTED  Goal: Ability to adjust  to condition or change in health will improve  Outcome: PROGRESSING AS EXPECTED  Goal: Ability to utilize strategies to promote effective socialization will improve  Outcome: PROGRESSING AS EXPECTED     Problem: Physical Regulation:  Goal: Complications related to the disease process, condition or treatment will be avoided or minimized  Outcome: PROGRESSING AS EXPECTED  Goal: Diagnostic test results will improve  Outcome: PROGRESSING AS EXPECTED     Problem: Medication:  Goal: Risk for medication side effects will decrease  Outcome: PROGRESSING AS EXPECTED  Goal: Patient's knowledge of medication regimen will improve  Outcome: PROGRESSING AS EXPECTED     Problem: Knowledge Deficit:  Goal: Knowledge of the prescribed therapeutic regimen will improve  Outcome: PROGRESSING AS EXPECTED     Problem: Health Behavior:  Goal: Ability to manage health-related needs will improve  Outcome: PROGRESSING AS EXPECTED